# Patient Record
Sex: MALE | Race: BLACK OR AFRICAN AMERICAN | NOT HISPANIC OR LATINO | Employment: FULL TIME | URBAN - METROPOLITAN AREA
[De-identification: names, ages, dates, MRNs, and addresses within clinical notes are randomized per-mention and may not be internally consistent; named-entity substitution may affect disease eponyms.]

---

## 2020-12-02 ENCOUNTER — HOSPITAL ENCOUNTER (EMERGENCY)
Facility: HOSPITAL | Age: 45
Discharge: HOME | End: 2020-12-02
Attending: EMERGENCY MEDICINE

## 2020-12-02 VITALS
HEART RATE: 80 BPM | WEIGHT: 245 LBS | RESPIRATION RATE: 18 BRPM | OXYGEN SATURATION: 99 % | TEMPERATURE: 98 F | BODY MASS INDEX: 28.35 KG/M2 | HEIGHT: 78 IN | DIASTOLIC BLOOD PRESSURE: 93 MMHG | SYSTOLIC BLOOD PRESSURE: 142 MMHG

## 2020-12-02 DIAGNOSIS — S61.011A LACERATION OF RIGHT THUMB WITHOUT FOREIGN BODY WITHOUT DAMAGE TO NAIL, INITIAL ENCOUNTER: Primary | ICD-10-CM

## 2020-12-02 DIAGNOSIS — R52 PAIN: ICD-10-CM

## 2020-12-02 PROCEDURE — 12002 RPR S/N/AX/GEN/TRNK2.6-7.5CM: CPT | Mod: F5

## 2020-12-02 PROCEDURE — 90715 TDAP VACCINE 7 YRS/> IM: CPT | Performed by: NURSE PRACTITIONER

## 2020-12-02 PROCEDURE — 90471 IMMUNIZATION ADMIN: CPT | Performed by: NURSE PRACTITIONER

## 2020-12-02 PROCEDURE — 25000003 PHARM REV CODE 250: Performed by: NURSE PRACTITIONER

## 2020-12-02 PROCEDURE — 99284 EMERGENCY DEPT VISIT MOD MDM: CPT | Mod: 25

## 2020-12-02 PROCEDURE — 63600175 PHARM REV CODE 636 W HCPCS: Performed by: NURSE PRACTITIONER

## 2020-12-02 RX ORDER — IBUPROFEN 600 MG/1
600 TABLET ORAL
Status: COMPLETED | OUTPATIENT
Start: 2020-12-02 | End: 2020-12-02

## 2020-12-02 RX ORDER — CEPHALEXIN 500 MG/1
500 CAPSULE ORAL 4 TIMES DAILY
Qty: 20 CAPSULE | Refills: 0 | Status: SHIPPED | OUTPATIENT
Start: 2020-12-02 | End: 2020-12-07

## 2020-12-02 RX ORDER — BACITRACIN ZINC 500 [USP'U]/G
1 OINTMENT TOPICAL 2 TIMES DAILY
Status: COMPLETED | OUTPATIENT
Start: 2020-12-02 | End: 2020-12-02

## 2020-12-02 RX ORDER — LIDOCAINE HYDROCHLORIDE 20 MG/ML
5 INJECTION, SOLUTION EPIDURAL; INFILTRATION; INTRACAUDAL; PERINEURAL
Status: COMPLETED | OUTPATIENT
Start: 2020-12-02 | End: 2020-12-02

## 2020-12-02 RX ADMIN — IBUPROFEN 600 MG: 600 TABLET, FILM COATED ORAL at 11:12

## 2020-12-02 RX ADMIN — BACITRACIN 1 EACH: 500 OINTMENT TOPICAL at 11:12

## 2020-12-02 RX ADMIN — LIDOCAINE HYDROCHLORIDE 100 MG: 20 INJECTION, SOLUTION EPIDURAL; INFILTRATION; INTRACAUDAL at 11:12

## 2020-12-02 RX ADMIN — CLOSTRIDIUM TETANI TOXOID ANTIGEN (FORMALDEHYDE INACTIVATED), CORYNEBACTERIUM DIPHTHERIAE TOXOID ANTIGEN (FORMALDEHYDE INACTIVATED), BORDETELLA PERTUSSIS TOXOID ANTIGEN (GLUTARALDEHYDE INACTIVATED), BORDETELLA PERTUSSIS FILAMENTOUS HEMAGGLUTININ ANTIGEN (FORMALDEHYDE INACTIVATED), BORDETELLA PERTUSSIS PERTACTIN ANTIGEN, AND BORDETELLA PERTUSSIS FIMBRIAE 2/3 ANTIGEN 0.5 ML: 5; 2; 2.5; 5; 3; 5 INJECTION, SUSPENSION INTRAMUSCULAR at 11:12

## 2020-12-02 NOTE — ED NOTES
Presents with laceration to right thumb state cut on table saw prior to arival no obvious deformities to bone positive ROM  NAD noted Examined per Claire NP

## 2020-12-02 NOTE — ED PROVIDER NOTES
"Amy Date: 12/2/2020    SCRIBE #1 NOTE: I, Adrian Duran, am scribing for, and in the presence of, Maia ARITA.       History     Chief Complaint   Patient presents with    Hand Injury     Rt thumb cut on table saw earlier today (approx 20 min PTA)     Time seen by provider: 10:59 AM on 12/02/2020      Felix Stevens is a 45 y.o. male with no pertinent PMHx who presents to the ED for a laceration to the right thumb that occurred < 1 hour PTA. Patient states that he was cutting a piece of wood with a table saw, when the wood "Was pulled by the saw and it took my hand and thumb with it." He states that he has a laceration to the tip right thumb. He states that he immediately wrapped the finger in a towel and wrapped electrical tape over the towel. Patient reports that he is right hand dominate. He states that he is unsure of his last tetanus shot. He denies numbness, weakness, swelling, other wounds, hx of DM, or any other complaint at this time. The patient has no pertinent PSHx.             The history is provided by the patient.     Review of patient's allergies indicates:  No Known Allergies  History reviewed. No pertinent past medical history.  History reviewed. No pertinent surgical history.  History reviewed. No pertinent family history.  Social History     Tobacco Use    Smoking status: Current Every Day Smoker     Packs/day: 0.50   Substance Use Topics    Alcohol use: Yes     Comment: occasionally    Drug use: Not on file     Review of Systems   Constitutional: Negative for activity change, appetite change, chills and fever.   HENT: Negative for congestion, rhinorrhea and sore throat.    Eyes: Negative for redness and visual disturbance.   Respiratory: Negative for cough, chest tightness and shortness of breath.    Cardiovascular: Negative for chest pain.   Gastrointestinal: Negative for abdominal pain, diarrhea, nausea and vomiting.   Genitourinary: Negative for dysuria and frequency. "   Musculoskeletal: Negative for back pain, joint swelling, neck pain and neck stiffness.   Skin: Positive for wound. Negative for rash.   Neurological: Negative for dizziness, syncope, weakness, numbness and headaches.       Physical Exam     Initial Vitals [12/02/20 1053]   BP Pulse Resp Temp SpO2   (!) 142/93 80 18 98.3 °F (36.8 °C) 99 %      MAP       --         Physical Exam    Nursing note and vitals reviewed.  Constitutional: Vital signs are normal. He appears well-developed and well-nourished.   HENT:   Head: Normocephalic and atraumatic.   Eyes: Pupils are equal, round, and reactive to light.   Neck: Neck supple.   Cardiovascular: Normal rate, regular rhythm, normal heart sounds and intact distal pulses. Exam reveals no gallop and no friction rub.    No murmur heard.  Pulmonary/Chest: Breath sounds normal. He has no wheezes. He has no rhonchi. He has no rales.   Abdominal: Normal appearance.   Musculoskeletal:        Right hand: He exhibits tenderness and laceration. He exhibits normal range of motion, no bony tenderness, normal two-point discrimination, normal capillary refill, no deformity and no swelling. Normal sensation noted. Normal strength noted.        Hands:       Comments: Full extension and flexion of the right thumb with laceration to pad of right thumb. Nail bed intact; normal flexion and extension of the right thumb; normal extension of the right thumb; no snuff box tenderness. See images attached. No visible or palpable FB.   Neurological: He is alert and oriented to person, place, and time. He has normal strength.   Skin: Skin is warm and dry. Laceration noted.   Right thumb: Fat pad of the right thumb 3 cm horizontal laceration   Psychiatric: He has a normal mood and affect. His speech is normal and behavior is normal.                 ED Course   Lac Repair    Date/Time: 12/2/2020 12:56 PM  Performed by: Maia Majano NP  Authorized by: Abner Silvestre III, MD     Consent:     Consent  obtained:  Verbal    Consent given by:  Patient    Risks discussed:  Infection, pain, poor cosmetic result and poor wound healing  Anesthesia (see MAR for exact dosages):     Anesthesia method:  Nerve block    Block needle gauge:  25 G    Block anesthetic:  Lidocaine 1% w/o epi    Block technique:  Digital    Block injection procedure:  Anatomic landmarks identified, introduced needle, incremental injection, negative aspiration for blood and anatomic landmarks palpated    Block outcome:  Anesthesia achieved  Laceration details:     Location:  Finger    Finger location:  R thumb    Length (cm):  3  Repair type:     Repair type:  Simple  Pre-procedure details:     Preparation:  Patient was prepped and draped in usual sterile fashion and imaging obtained to evaluate for foreign bodies  Exploration:     Hemostasis achieved with:  Direct pressure    Wound exploration: wound explored through full range of motion      Wound extent: no fascia violation noted, no foreign bodies/material noted, no muscle damage noted, no nerve damage noted, no tendon damage noted, no underlying fracture noted and no vascular damage noted      Contaminated: no    Treatment:     Area cleansed with:  Hibiclens    Amount of cleaning:  Extensive    Irrigation solution:  Sterile saline    Irrigation method:  Syringe    Visualized foreign bodies/material removed: no    Skin repair:     Repair method:  Sutures    Suture size:  5-0    Wound skin closure material used: Vicryl rapede     Suture technique:  Simple interrupted    Number of sutures:  5  Approximation:     Approximation:  Loose  Post-procedure details:     Dressing:  Sterile dressing and splint for protection    Patient tolerance of procedure:  Tolerated well, no immediate complications  Comments:      1 4-0 ethilon suture placed      Labs Reviewed - No data to display       Imaging Results          X-Ray Hand 2 View Right (Final result)  Result time 12/02/20 11:36:27    Final result by  Cm Rosario MD (12/02/20 11:36:27)                 Impression:      Right thumb soft tissue injury without acute osseous abnormality.      Electronically signed by: Cm Rosario MD  Date:    12/02/2020  Time:    11:36             Narrative:    EXAMINATION:  XR HAND 2 VIEW RIGHT    CLINICAL HISTORY:  Pain, unspecified    TECHNIQUE:  Three views of the right hand    COMPARISON:  None    FINDINGS:  There is soft tissue injury of the tip of the thumb.  No radiopaque foreign body.  No fracture or dislocation is present.                                 Medical Decision Making:   History:   Old Medical Records: I decided to obtain old medical records.  Differential Diagnosis:   Laceration  Contusion  Open fracture  Clinical Tests:   Radiological Study: Ordered and Reviewed       APC / Resident Notes:   Patient is a 45 y.o. male who presents to the ED 12/02/2020 who underwent emergent evaluation for right thumb injury.  Patient has obvious laceration to the fat pad of right thumb.  It is not easily approximated due to overlying abrasion and missing skin.  Internal sutures placed and wound pull together and approximated as well as possible.  Patient tolerated well.  Adequate hemostasis obtained.  Discussed possible scarring.  Discussed follow-up with hand surgeon and referral is given.  Wound is cleaned and irrigated thoroughly prior to repair.  X-rays did not show any acute underlying fracture.  I do not think underlying fracture.  There was no retained foreign body.  I do not think retained foreign body.  Nail bed is intact.  Patient has normal flexion and no signs of tendon injury.  He has normal sensation and strength of the right thumb.  His tetanus is updated in the emergency department today and he is placed prophylactically on antibiotics.  Based on my clinical evaluation, I do not appreciate any immediate, emergent, or life threatening condition or etiology that warrants additional workup today and  feel that the patient can be discharged with close follow up care. Case discussed with Dr. Silvestre who also evaluated patient and who is agreeable to plan of care. Follow up and return precautions discussed; patient verbalized understanding and is agreeable to plan of care. Patient discharged home in stable condition.               Scribe Attestation:   Scribe #1: I performed the above scribed service and the documentation accurately describes the services I performed. I attest to the accuracy of the note.    Attending Attestation:           Physician Attestation for Scribe:  Physician Attestation Statement for Scribe #1: I, Maia Majano, reviewed documentation, as scribed by in my presence, and it is both accurate and complete.     Comments: I, Maia Majano NP-C, personally performed the services described in this documentation. All medical record entries made by the scribe were at my direction and in my presence.  I have reviewed the chart and agree that the record reflects my personal performance and is accurate and complete. LYLA Cain.  1:54 PM 12/02/2020                        Clinical Impression:     ICD-10-CM ICD-9-CM   1. Laceration of right thumb without foreign body without damage to nail, initial encounter  S61.011A 883.0   2. Pain  R52 780.96                      Disposition:   Disposition: Discharged  Condition: Stable     ED Disposition Condition    Discharge Stable        ED Prescriptions     Medication Sig Dispense Start Date End Date Auth. Provider    cephALEXin (KEFLEX) 500 MG capsule Take 1 capsule (500 mg total) by mouth 4 (four) times daily. for 5 days 20 capsule 12/2/2020 12/7/2020 Maia Majano NP        Follow-up Information     Follow up With Specialties Details Why Contact Info    Cm White MD Hand Surgery In 3 days  [address] [facility] [address]  [facility] [address]  [phone]      [facility] Emergency Medicine  As needed, If symptoms worsen [address]  [facility] [id number] [phone]    [facility]  In 10 days For wound re-check, For suture removal [facility]ASH[facility] [address]  [phone]                                         Maia Majano NP  12/02/20 [id number]